# Patient Record
Sex: FEMALE | Race: BLACK OR AFRICAN AMERICAN
[De-identification: names, ages, dates, MRNs, and addresses within clinical notes are randomized per-mention and may not be internally consistent; named-entity substitution may affect disease eponyms.]

---

## 2019-09-11 ENCOUNTER — HOSPITAL ENCOUNTER (EMERGENCY)
Dept: HOSPITAL 61 - ER | Age: 29
Discharge: HOME | End: 2019-09-11
Payer: MEDICAID

## 2019-09-11 VITALS — DIASTOLIC BLOOD PRESSURE: 69 MMHG | SYSTOLIC BLOOD PRESSURE: 110 MMHG

## 2019-09-11 VITALS — BODY MASS INDEX: 25.75 KG/M2 | HEIGHT: 62.5 IN | WEIGHT: 143.5 LBS

## 2019-09-11 DIAGNOSIS — F32.9: ICD-10-CM

## 2019-09-11 DIAGNOSIS — R51: Primary | ICD-10-CM

## 2019-09-11 LAB
ALBUMIN SERPL-MCNC: 4.3 G/DL (ref 3.4–5)
ALBUMIN/GLOB SERPL: 1.1 {RATIO} (ref 1–1.7)
ALP SERPL-CCNC: 96 U/L (ref 46–116)
ALT SERPL-CCNC: 19 U/L (ref 14–59)
ANION GAP SERPL CALC-SCNC: 16 MMOL/L (ref 6–14)
APTT PPP: YELLOW S
AST SERPL-CCNC: 19 U/L (ref 15–37)
BACTERIA #/AREA URNS HPF: (no result) /HPF
BASOPHILS # BLD AUTO: 0.1 X10^3/UL (ref 0–0.2)
BASOPHILS NFR BLD: 1 % (ref 0–3)
BILIRUB SERPL-MCNC: 0.5 MG/DL (ref 0.2–1)
BILIRUB UR QL STRIP: NEGATIVE
BUN SERPL-MCNC: 16 MG/DL (ref 7–20)
BUN/CREAT SERPL: 20 (ref 6–20)
CALCIUM SERPL-MCNC: 9.5 MG/DL (ref 8.5–10.1)
CHLORIDE SERPL-SCNC: 104 MMOL/L (ref 98–107)
CO2 SERPL-SCNC: 21 MMOL/L (ref 21–32)
CREAT SERPL-MCNC: 0.8 MG/DL (ref 0.6–1)
EOSINOPHIL NFR BLD: 0.1 X10^3/UL (ref 0–0.7)
EOSINOPHIL NFR BLD: 2 % (ref 0–3)
ERYTHROCYTE [DISTWIDTH] IN BLOOD BY AUTOMATED COUNT: 14.6 % (ref 11.5–14.5)
FIBRINOGEN PPP-MCNC: CLEAR MG/DL
GFR SERPLBLD BASED ON 1.73 SQ M-ARVRAT: 101.9 ML/MIN
GLOBULIN SER-MCNC: 3.9 G/DL (ref 2.2–3.8)
GLUCOSE SERPL-MCNC: 95 MG/DL (ref 70–99)
HCT VFR BLD CALC: 40.9 % (ref 36–47)
HGB BLD-MCNC: 13.6 G/DL (ref 12–15.5)
LYMPHOCYTES # BLD: 1.9 X10^3/UL (ref 1–4.8)
LYMPHOCYTES NFR BLD AUTO: 31 % (ref 24–48)
MCH RBC QN AUTO: 26 PG (ref 25–35)
MCHC RBC AUTO-ENTMCNC: 33 G/DL (ref 31–37)
MCV RBC AUTO: 79 FL (ref 79–100)
MONO #: 0.5 X10^3/UL (ref 0–1.1)
MONOCYTES NFR BLD: 8 % (ref 0–9)
NEUT #: 3.7 X10^3/UL (ref 1.8–7.7)
NEUTROPHILS NFR BLD AUTO: 59 % (ref 31–73)
NITRITE UR QL STRIP: NEGATIVE
PH UR STRIP: 7.5 [PH]
PLATELET # BLD AUTO: 190 X10^3/UL (ref 140–400)
POTASSIUM SERPL-SCNC: 3.5 MMOL/L (ref 3.5–5.1)
PROT SERPL-MCNC: 8.2 G/DL (ref 6.4–8.2)
PROT UR STRIP-MCNC: NEGATIVE MG/DL
RBC # BLD AUTO: 5.18 X10^6/UL (ref 3.5–5.4)
RBC #/AREA URNS HPF: (no result) /HPF (ref 0–2)
SODIUM SERPL-SCNC: 141 MMOL/L (ref 136–145)
SQUAMOUS #/AREA URNS LPF: (no result) /LPF
U PREG PATIENT: NEGATIVE
UROBILINOGEN UR-MCNC: 1 MG/DL
WBC # BLD AUTO: 6.2 X10^3/UL (ref 4–11)
WBC #/AREA URNS HPF: (no result) /HPF (ref 0–4)

## 2019-09-11 PROCEDURE — 81025 URINE PREGNANCY TEST: CPT

## 2019-09-11 PROCEDURE — 80053 COMPREHEN METABOLIC PANEL: CPT

## 2019-09-11 PROCEDURE — 99285 EMERGENCY DEPT VISIT HI MDM: CPT

## 2019-09-11 PROCEDURE — 36415 COLL VENOUS BLD VENIPUNCTURE: CPT

## 2019-09-11 PROCEDURE — 85025 COMPLETE CBC W/AUTO DIFF WBC: CPT

## 2019-09-11 PROCEDURE — 87086 URINE CULTURE/COLONY COUNT: CPT

## 2019-09-11 PROCEDURE — 70450 CT HEAD/BRAIN W/O DYE: CPT

## 2019-09-11 PROCEDURE — 96372 THER/PROPH/DIAG INJ SC/IM: CPT

## 2019-09-11 PROCEDURE — 81001 URINALYSIS AUTO W/SCOPE: CPT

## 2019-09-11 PROCEDURE — 96374 THER/PROPH/DIAG INJ IV PUSH: CPT

## 2019-09-11 PROCEDURE — 96375 TX/PRO/DX INJ NEW DRUG ADDON: CPT

## 2019-09-11 RX ADMIN — PROCHLORPERAZINE EDISYLATE ONE MG: 5 INJECTION INTRAMUSCULAR; INTRAVENOUS at 14:40

## 2019-09-11 RX ADMIN — KETOROLAC TROMETHAMINE ONE MG: 30 INJECTION, SOLUTION INTRAMUSCULAR at 14:40

## 2019-09-11 NOTE — PHYS DOC
Past Medical History


Past Medical History:  Depression, Other


Additional Past Medical Histor:  autism


Past Surgical History:  Other


Additional Past Surgical Histo:  hemorrhoids


Alcohol Use:  None


Drug Use:  None





Adult General


Chief Complaint


Chief Complaint:  HEADACHE





HPI


HPI





Patient is a 28  year old female who presents with 1 week of off and on 

headaches. The patient is diagnosed with autism and has no history of headaches.

Mother states the that the patient complains of a headache and then has 

aggressive outburst of kicking and punching and breaking and tearing up things. 

Mother states she's not usually like this and has no history of headaches. 

Mother states that she has not had nausea, vomiting, abdominal pain, recent 

illness, fevers, diarrhea, constipation, shortness of air, chest pain.





Review of Systems


Review of Systems





Constitutional: Denies fever or chills []


Integument: Denies rash or skin lesions []


Neurologic:  headache, denies focal weakness or sensory changes, violent 

outbursts []








All other systems were reviewed and found to be within normal limits, except as 

documented in this note.





Current Medications


Current Medications





Current Medications








 Medications


  (Trade)  Dose


 Ordered  Sig/Steven  Start Time


 Stop Time Status Last Admin


Dose Admin


 


 Ketorolac


 Tromethamine


  (Toradol 30mg


 Vial)  30 mg  1X  ONCE  19 13:00


 19 13:06 DC 19 14:40


30 MG


 


 Lorazepam


  (Ativan Inj)  2 mg  STK-MED ONCE  19 13:53


 19 13:54 DC  





 


 Prochlorperazine


 Edisylate


  (Compazine)  10 mg  1X  ONCE  19 13:00


 19 13:06 DC 19 14:40


10 MG











Allergies


Allergies





Allergies








Coded Allergies Type Severity Reaction Last Updated Verified


 


  No Known Drug Allergies    19 No











Physical Exam


Physical Exam





Constitutional: Well developed, well nourished, no acute distress, non-toxic 

appearance. []


HENT: Normocephalic, atraumatic, bilateral external ears normal, oropharynx 

moist, no oral exudates, nose normal. []


Eyes: PERRLA, EOMI, conjunctiva normal, no discharge. [] 


Neck: Normal range of motion, no tenderness, supple, no stridor. [] 


Cardiovascular:Heart rate regular rhythm, no murmur []


Lungs & Thorax:  Bilateral breath sounds clear to auscultation []


Abdomen: Bowel sounds normal, soft, no tenderness, no masses, no pulsatile 

masses. [] 


Skin: Warm, dry, no erythema, no rash. [] 


Extremities: No tenderness, no cyanosis, no clubbing, ROM intact, no edema. [] 


Neurologic: Alert and oriented X 2, normal motor function, normal sensory 

function, no focal deficits noted. []


Psychologic:Smiling,  Affect normal, judgement normal, mood normal. []





Current Patient Data


Vital Signs





                                   Vital Signs








  Date Time  Temp Pulse Resp B/P (MAP) Pulse Ox O2 Delivery O2 Flow Rate FiO2


 


19 14:46  100 16  98   


 


19 12:25 98.5   121/81 (94)  Room Air  





 98.5       








Lab Values





                                Laboratory Tests








Test


 19


12:55 19


14:31


 


Urine Collection Type Unknown   


 


Urine Color Yellow   


 


Urine Clarity Clear   


 


Urine pH 7.5   


 


Urine Specific Gravity 1.025   


 


Urine Protein


 Negative mg/dL


(NEG-TRACE) 





 


Urine Glucose (UA)


 Negative mg/dL


(NEG) 





 


Urine Ketones (Stick)


 40 mg/dL (NEG)


 





 


Urine Blood


 Negative (NEG)


 





 


Urine Nitrite


 Negative (NEG)


 





 


Urine Bilirubin


 Negative (NEG)


 





 


Urine Urobilinogen Dipstick


 1.0 mg/dL (0.2


mg/dL) 





 


Urine Leukocyte Esterase Trace (NEG)   


 


Urine RBC


 Occ /HPF (0-2)


 





 


Urine WBC


 1-4 /HPF (0-4)


 





 


Urine Squamous Epithelial


Cells Many /LPF  


 





 


Urine Bacteria


 Moderate /HPF


(0-FEW) 





 


Urine Mucus Marked /LPF   


 


Urine Pregnancy Test


 Negative (NEG)


 





 


White Blood Count


 


 6.2 x10^3/uL


(4.0-11.0)


 


Red Blood Count


 


 5.18 x10^6/uL


(3.50-5.40)


 


Hemoglobin


 


 13.6 g/dL


(12.0-15.5)


 


Hematocrit


 


 40.9 %


(36.0-47.0)


 


Mean Corpuscular Volume


 


 79 fL ()





 


Mean Corpuscular Hemoglobin  26 pg (25-35)  


 


Mean Corpuscular Hemoglobin


Concent 


 33 g/dL


(31-37)


 


Red Cell Distribution Width


 


 14.6 %


(11.5-14.5)  H


 


Platelet Count


 


 190 x10^3/uL


(140-400)


 


Neutrophils (%) (Auto)  59 % (31-73)  


 


Lymphocytes (%) (Auto)  31 % (24-48)  


 


Monocytes (%) (Auto)  8 % (0-9)  


 


Eosinophils (%) (Auto)  2 % (0-3)  


 


Basophils (%) (Auto)  1 % (0-3)  


 


Neutrophils # (Auto)


 


 3.7 x10^3/uL


(1.8-7.7)


 


Lymphocytes # (Auto)


 


 1.9 x10^3/uL


(1.0-4.8)


 


Monocytes # (Auto)


 


 0.5 x10^3/uL


(0.0-1.1)


 


Eosinophils # (Auto)


 


 0.1 x10^3/uL


(0.0-0.7)


 


Basophils # (Auto)


 


 0.1 x10^3/uL


(0.0-0.2)


 


Sodium Level


 


 141 mmol/L


(136-145)


 


Potassium Level


 


 3.5 mmol/L


(3.5-5.1)


 


Chloride Level


 


 104 mmol/L


()


 


Carbon Dioxide Level


 


 21 mmol/L


(21-32)


 


Anion Gap  16 (6-14)  H


 


Blood Urea Nitrogen


 


 16 mg/dL


(7-20)


 


Creatinine


 


 0.8 mg/dL


(0.6-1.0)


 


Estimated GFR


(Cockcroft-Gault) 


 101.9  





 


BUN/Creatinine Ratio  20 (6-20)  


 


Glucose Level


 


 95 mg/dL


(70-99)


 


Calcium Level


 


 9.5 mg/dL


(8.5-10.1)


 


Total Bilirubin


 


 0.5 mg/dL


(0.2-1.0)


 


Aspartate Amino Transferase


(AST) 


 19 U/L (15-37)





 


Alanine Aminotransferase (ALT)


 


 19 U/L (14-59)





 


Alkaline Phosphatase


 


 96 U/L


()


 


Total Protein


 


 8.2 g/dL


(6.4-8.2)


 


Albumin


 


 4.3 g/dL


(3.4-5.0)


 


Albumin/Globulin Ratio  1.1 (1.0-1.7)  





                                Laboratory Tests


19 14:31








                                Laboratory Tests


19 14:31











EKG


EKG


[]





Radiology/Procedures


Radiology/Procedures


[]


Impressions:


[facility]


                    [address]  [place], [place] [id number] [phone]


                                        


                                 IMAGING REPORT





                                     Signed





PATIENT: LUIS URBINA ACCOUNT: [id number]     MRN#: [id number]


: 1990           LOCATION: ER              AGE: 28


SEX: F                    EXAM DT: 19         ACCESSION#: [id number]


STATUS: REG ER            ORD. PHYSICIAN: TERESA RAMIREZ


REASON: headache X 4 DAYS


PROCEDURE: CT HEAD WO CONTRAST





CT HEAD WO CONTRAST


 


History: Headache.


 


Comparison: None.


 


Technique: Noncontrast CT imaging was performed of the head.  


 


Exposure: One or more of the following individualized dose reduction 


techniques were utilized for this examination:  


1. Automated exposure control  


2. Adjustment of the mA and/or kV according to patient size  


3. Use of iterative reconstruction technique.


 


Findings: 


No intracranial hemorrhage. No mass effect. No hydrocephalus. Extra-axial 


spaces are unremarkable. 


 


Imaged orbits are unremarkable. Imaged paranasal sinuses and mastoid air 


cells are clear.


 


Impression:


1.  No acute intracranial abnormality. 


 


Electronically signed by: Ramiro Cantu DO (2019 1:52 PM) [facility]














DICTATED and SIGNED BY:     RAMIRO CANTU DO


DATE:     19 1352








Course & Med Decision Making


Course & Med Decision Making


Patient is a 28  year old female who presents with 1 week of off and on 

headaches. The patient is diagnosed with autism and has no history of headaches.

 Mother states the that the patient complains of a headache and then has 

aggressive outburst of kicking and punching and breaking and tearing up things. 

Mother states she's not usually like this and has no history of headaches. 

Mother states that she has not had nausea, vomiting, abdominal pain, recent 

illness, fevers, diarrhea, constipation, shortness of air, chest pain. Patient 

speaks in full clear sentences. When patient is asked where her pain as she 

points to her frontal lobe. Patient is talking and smiling in the room. Abdomen 

soft and nontender. PERRLA. Patient is alert but states a incomprehensible word 

when asked where she is at. Skin pink warm and dry. Mucous migraines are moist. 

Vital signs within normal limits. Afebrile. Clear to auscultation in all lobes. 

No extremity edema. Impetigo with steady gait. Equal strength and  in all 

extremities. FLACC 1/10. Mother states she has not given the patient anything 

for pain today. The mother states usually is a fight take her to the doctor or 

to the hospital and today the patient wanted to go to the hospital.





Blood work unremarkable. CT of the head shows no acute findings. Patient states 

she no longer has headache and is feeling much better after medications were 

given. Urinalysis shows contamination. Urine is sent for culture. Follow-up with

 primary care provider.





Bertin Disclaimer


Dragon Disclaimer


This electronic medical record was generated, in whole or in part, using a voice

 recognition dictation system.





Departure


Departure


Impression:  


   Primary Impression:  


   Headache


Disposition:   HOME, SELF-CARE


Condition:  STABLE


Referrals:  


WICHO ROSARIO (PCP)


Patient Instructions:  General Headache Without Cause





Additional Instructions:  


Drink plenty of fluids. Follow up with primary care provider. Take Tylenol or 

ibuprofen for pain.





Problem Qualifiers








   Primary Impression:  


   Headache


   Headache type:  unspecified  Headache chronicity pattern:  episodic headache 

    Intractability:  not intractable  Qualified Codes:  R51 - Headache








TERESA RAMIREZ            Sep 11, 2019 13:00

## 2019-09-30 NOTE — RAD
Nursing Transfer Note      9/30/2019     Transfer From: Xray    Transfer via stretcher    Transfer with VAD bag     Transported by exscort    Medicines sent: none       CT HEAD WO CONTRAST

 

History: Headache.

 

Comparison: None.

 

Technique: Noncontrast CT imaging was performed of the head.  

 

Exposure: One or more of the following individualized dose reduction 

techniques were utilized for this examination:  

1. Automated exposure control  

2. Adjustment of the mA and/or kV according to patient size  

3. Use of iterative reconstruction technique.

 

Findings: 

No intracranial hemorrhage. No mass effect. No hydrocephalus. Extra-axial 

spaces are unremarkable. 

 

Imaged orbits are unremarkable. Imaged paranasal sinuses and mastoid air 

cells are clear.

 

Impression:

1.  No acute intracranial abnormality. 

 

Electronically signed by: Ramiro Cantu DO (9/11/2019 1:52 PM) [facility]

## 2019-11-24 ENCOUNTER — HOSPITAL ENCOUNTER (EMERGENCY)
Dept: HOSPITAL 61 - ER | Age: 29
Discharge: HOME | End: 2019-11-24
Payer: MEDICAID

## 2019-11-24 VITALS — WEIGHT: 140 LBS | HEIGHT: 62 IN | BODY MASS INDEX: 25.76 KG/M2

## 2019-11-24 VITALS — DIASTOLIC BLOOD PRESSURE: 79 MMHG | SYSTOLIC BLOOD PRESSURE: 116 MMHG

## 2019-11-24 DIAGNOSIS — M25.512: ICD-10-CM

## 2019-11-24 DIAGNOSIS — M79.602: Primary | ICD-10-CM

## 2019-11-24 PROCEDURE — 99284 EMERGENCY DEPT VISIT MOD MDM: CPT

## 2019-11-24 PROCEDURE — 73090 X-RAY EXAM OF FOREARM: CPT

## 2019-11-24 PROCEDURE — 73030 X-RAY EXAM OF SHOULDER: CPT

## 2019-11-24 PROCEDURE — 73110 X-RAY EXAM OF WRIST: CPT

## 2019-11-24 PROCEDURE — 73060 X-RAY EXAM OF HUMERUS: CPT

## 2019-11-24 NOTE — PHYS DOC
Past Medical History


Past Medical History:  Depression, Other


Additional Past Medical Histor:  autism


Past Surgical History:  Other


Additional Past Surgical Histo:  hemorrhoids


Alcohol Use:  None


Drug Use:  None





Adult General


Chief Complaint


Chief Complaint:  UPPER EXTREMITY PAIN





HPI


HPI





Patient is a 29  year old female who presents with HIS abdomen nonverbal. Mother

states she woke this morning and did not want to raise her arm above shoulder 

height at the shoulder. Patient points to her shoulder and states her shoulder 

hurts. Mother denies the patient having any injury or doing any activity out of 

the norm.





Review of Systems


Review of Systems








Musculoskeletal: Denies back pain. Left shoulder pain joint pain []








All other systems were reviewed and found to be within normal limits, except as 

documented in this note.





Allergies


Allergies





Allergies








Coded Allergies Type Severity Reaction Last Updated Verified


 


  No Known Drug Allergies    19 No











Physical Exam


Physical Exam





Constitutional: Well developed, well nourished, no acute distress, non-toxic 

appearance. []


Skin: Warm, dry, no erythema, no rash. [] 


Back: No tenderness, no CVA tenderness. [] 


Extremities: Left arm tenderness, no cyanosis, no clubbing, Left shoulder ROM 

not intact, no edema. [] 


Neurologic: Alert and oriented X 3, normal motor function, normal sensory 

function, no focal deficits noted. []


Psychologic: Affect normal, judgement normal, mood normal. []





Current Patient Data


Vital Signs





                                   Vital Signs








  Date Time  Temp Pulse Resp B/P (MAP) Pulse Ox O2 Delivery O2 Flow Rate FiO2


 


19 11:00 98.4 81 15 116/79 (91) 100 Room Air  





 98.4       











EKG


EKG


[]





Radiology/Procedures


Radiology/Procedures


[]


Impressions:


[facility]


                    [address]  [place], [place] [id number] [phone]


                                        


                                 IMAGING REPORT





                                     Signed





PATIENT: LUIS URBINA ACCOUNT: [id number]     MRN#: [id number]


: 1990           LOCATION: ER              AGE: 29


SEX: F                    EXAM DT: 19         ACCESSION#: [id number]


STATUS: REG ER            ORD. PHYSICIAN: TERESA RAMIREZ


REASON: pain, limited ROM


PROCEDURE: FOREARM LEFT





EXAM: 


1. Left shoulder 3 views.


2. Left humerus 2 views.


3. Left forearm 2 views.


4. Left wrist 3 views.


 


HISTORY: Left upper extremity pain and limited range of motion.


 


COMPARISON: None.


 


FINDINGS: No fractures are appreciated about the left shoulder and within 


the left humerus. Joint spaces and alignment are maintained.


 


No fractures are appreciated in the left forearm. The joint spaces and 


alignment of the left elbow are grossly maintained.


 


There is some soft tissue swelling dorsally at the wrist and no fractures 


are identified. There are degenerative cysts within the scaphoid, lunate 


and capitate measuring up to 6 mm in the scaphoid waist. Radiocarpal and 


intercarpal joint spaces and alignment are maintained.


 


IMPRESSION: 


1. No fracture or malalignment.


 


Electronically signed by: JIMMIE Ovalles MD (2019 12:17 PM) 


[facility]














DICTATED and SIGNED BY:     COLLINS OVALLES MD


DATE:     19 1217








Course & Med Decision Making


Course & Med Decision Making


When examining the patient she is smiling and stating yes when I am palpating 

the left arm asking if it hurts. Patient only lift the arm at shoulder height at

 the shoulder joint. Patient has full range of motion of the elbow, the wrist 

and the hand and fingers. Patient can make a fist. Strength is strong and equal.

 Skin pink warm and dry. No deformity or bruising or signs of injury. No laxity 

in any joints. Cap refill less than 3 seconds. Radial pulse strong and present. 

No deformity in the shoulder joint. Because has patient is nonverbal and has au

tism I x-rayed the whole arm. Vital signs within normal limits. Patient is a 

rheumatoid with a steady gait. PERRLA.





Dragon Disclaimer


Dragon Disclaimer


This electronic medical record was generated, in whole or in part, using a voice

 recognition dictation system.





Departure


Departure


Impression:  


   Primary Impression:  


   Left arm pain


Disposition:   HOME, SELF-CARE


Condition:  STABLE


Referrals:  


WICHO ROSARIO (PCP)


Patient Instructions:  Medical Screening Exam





Additional Instructions:  


Follow-up with primary care provider. Use ibuprofen for pain.











TERESA RAMIREZ            2019 12:27

## 2019-11-24 NOTE — RAD
EXAM: 

1. Left shoulder 3 views.

2. Left humerus 2 views.

3. Left forearm 2 views.

4. Left wrist 3 views.

 

HISTORY: Left upper extremity pain and limited range of motion.

 

COMPARISON: None.

 

FINDINGS: No fractures are appreciated about the left shoulder and within 

the left humerus. Joint spaces and alignment are maintained.

 

No fractures are appreciated in the left forearm. The joint spaces and 

alignment of the left elbow are grossly maintained.

 

There is some soft tissue swelling dorsally at the wrist and no fractures 

are identified. There are degenerative cysts within the scaphoid, lunate 

and capitate measuring up to 6 mm in the scaphoid waist. Radiocarpal and 

intercarpal joint spaces and alignment are maintained.

 

IMPRESSION: 

1. No fracture or malalignment.

 

Electronically signed by: JIMMIE Ovalles MD (11/24/2019 12:17 PM) 

[facility]

## 2019-11-24 NOTE — RAD
EXAM: 

1. Left shoulder 3 views.

2. Left humerus 2 views.

3. Left forearm 2 views.

4. Left wrist 3 views.

 

HISTORY: Left upper extremity pain and limited range of motion.

 

COMPARISON: None.

 

FINDINGS: No fractures are appreciated about the left shoulder and within 

the left humerus. Joint spaces and alignment are maintained.

 

No fractures are appreciated in the left forearm. The joint spaces and 

alignment of the left elbow are grossly maintained.

 

There is some soft tissue swelling dorsally at the wrist and no fractures 

are identified. There are degenerative cysts within the scaphoid, lunate 

and capitate measuring up to 6 mm in the scaphoid waist. Radiocarpal and 

intercarpal joint spaces and alignment are maintained.

 

IMPRESSION: 

1. No fracture or malalignment.

 

Electronically signed by: JIMMIE Ovalles MD (11/24/2019 12:17 PM) 

[facility]

## 2019-11-24 NOTE — RAD
EXAM: 

1. Left shoulder 3 views.

2. Left humerus 2 views.

3. Left forearm 2 views.

4. Left wrist 3 views.

 

HISTORY: Left upper extremity pain and limited range of motion.

 

COMPARISON: None.

 

FINDINGS: No fractures are appreciated about the left shoulder and within 

the left humerus. Joint spaces and alignment are maintained.

 

No fractures are appreciated in the left forearm. The joint spaces and 

alignment of the left elbow are grossly maintained.

 

There is some soft tissue swelling dorsally at the wrist and no fractures 

are identified. There are degenerative cysts within the scaphoid, lunate 

and capitate measuring up to 6 mm in the scaphoid waist. Radiocarpal and 

intercarpal joint spaces and alignment are maintained.

 

IMPRESSION: 

1. No fracture or malalignment.

 

Electronically signed by: JIMMIE Ovalles MD (11/24/2019 12:17 PM) 

[facility]

## 2019-11-24 NOTE — RAD
EXAM: 

1. Left shoulder 3 views.

2. Left humerus 2 views.

3. Left forearm 2 views.

4. Left wrist 3 views.

 

HISTORY: Left upper extremity pain and limited range of motion.

 

COMPARISON: None.

 

FINDINGS: No fractures are appreciated about the left shoulder and within 

the left humerus. Joint spaces and alignment are maintained.

 

No fractures are appreciated in the left forearm. The joint spaces and 

alignment of the left elbow are grossly maintained.

 

There is some soft tissue swelling dorsally at the wrist and no fractures 

are identified. There are degenerative cysts within the scaphoid, lunate 

and capitate measuring up to 6 mm in the scaphoid waist. Radiocarpal and 

intercarpal joint spaces and alignment are maintained.

 

IMPRESSION: 

1. No fracture or malalignment.

 

Electronically signed by: JIMMIE Ovalles MD (11/24/2019 12:17 PM) 

[facility]